# Patient Record
Sex: FEMALE
[De-identification: names, ages, dates, MRNs, and addresses within clinical notes are randomized per-mention and may not be internally consistent; named-entity substitution may affect disease eponyms.]

---

## 2024-10-29 ENCOUNTER — RX ONLY (RX ONLY)
Age: 54
End: 2024-10-29

## 2024-10-29 ENCOUNTER — APPOINTMENT (OUTPATIENT)
Dept: URBAN - METROPOLITAN AREA SURGERY 11 | Age: 54
Setting detail: DERMATOLOGY
End: 2024-10-29

## 2024-10-29 VITALS — WEIGHT: 230 LBS | HEIGHT: 62.5 IN

## 2024-10-29 DIAGNOSIS — L85.3 XEROSIS CUTIS: ICD-10-CM

## 2024-10-29 DIAGNOSIS — L92.0 GRANULOMA ANNULARE: ICD-10-CM

## 2024-10-29 PROCEDURE — OTHER PRESCRIPTION: OTHER

## 2024-10-29 PROCEDURE — OTHER COUNSELING: OTHER

## 2024-10-29 PROCEDURE — 99204 OFFICE O/P NEW MOD 45 MIN: CPT

## 2024-10-29 PROCEDURE — OTHER MIPS QUALITY: OTHER

## 2024-10-29 PROCEDURE — OTHER PRESCRIPTION MEDICATION MANAGEMENT: OTHER

## 2024-10-29 RX ORDER — CERAMIDE 1,3,6-II/SALICYLIC/B3
CLEANSER (ML) TOPICAL
Qty: 355 | Refills: 3 | Status: ERX | COMMUNITY
Start: 2024-10-29

## 2024-10-29 RX ORDER — CERAMIDES 1,3,6-II
CREAM (GRAM) TOPICAL
Qty: 453 | Refills: 3 | Status: ERX | COMMUNITY
Start: 2024-10-29

## 2024-10-29 RX ORDER — CERAMIDE 1,3,6-II/SALICYLIC/B3
CLEANSER (ML) TOPICAL
Qty: 89 | Refills: 5 | Status: ERX | COMMUNITY
Start: 2024-10-29

## 2024-10-29 RX ORDER — CERAMIDE 1,3,6-II/SALICYLIC/B3
CLEANSER (ML) TOPICAL
Qty: 144 | Refills: 4 | Status: ERX | COMMUNITY
Start: 2024-10-29

## 2024-10-29 RX ORDER — CLOBETASOL PROPIONATE 0.5 MG/G
CREAM TOPICAL BID
Qty: 60 | Refills: 3 | Status: ERX | COMMUNITY
Start: 2024-10-29

## 2024-10-29 ASSESSMENT — LOCATION DETAILED DESCRIPTION DERM
LOCATION DETAILED: RIGHT DISTAL POSTERIOR UPPER ARM
LOCATION DETAILED: RIGHT PROXIMAL DORSAL FOREARM
LOCATION DETAILED: LEFT DISTAL POSTERIOR UPPER ARM
LOCATION DETAILED: LEFT PROXIMAL DORSAL FOREARM
LOCATION DETAILED: LEFT LATERAL DORSAL FOOT

## 2024-10-29 ASSESSMENT — LOCATION SIMPLE DESCRIPTION DERM
LOCATION SIMPLE: RIGHT FOREARM
LOCATION SIMPLE: RIGHT POSTERIOR UPPER ARM
LOCATION SIMPLE: LEFT FOOT
LOCATION SIMPLE: LEFT FOREARM
LOCATION SIMPLE: LEFT POSTERIOR UPPER ARM

## 2024-10-29 ASSESSMENT — LOCATION ZONE DERM
LOCATION ZONE: FEET
LOCATION ZONE: ARM

## 2024-10-29 NOTE — HPI: RASH (GRANULOMA ANNULARE)
Is This A New Presentation, Or A Follow-Up?: Follow Up Granuloma Annulare
Additional History: Was getting injections to specific sites before. Did help. Makes them turn white and fades away. Has been confirmed with biopsy before\\n\\nHas used Clobetasol cream before, but not recently.\\n\\nHas gone on for years. Arms and one spot on left foot.

## 2024-10-29 NOTE — PROCEDURE: PRESCRIPTION MEDICATION MANAGEMENT
Detail Level: Zone
Plan: When applying medicine, educated that they are considered resolving if the plaques get flat and more red in appearance.
Initiate Treatment: clobetasol 0.05 % topical cream BID\\nApply to affected areas on arms and feet twice daily for 3 weeks, then decrease to up to twice a day up to 3 days a week.
Render In Strict Bullet Format?: No

## 2024-10-29 NOTE — PROCEDURE: COUNSELING
Detail Level: Detailed
Patient Specific Counseling (Will Not Stick From Patient To Patient): Educated on diagnosis and how it can be seen with people who have diabetes.